# Patient Record
Sex: FEMALE | Race: WHITE | URBAN - METROPOLITAN AREA
[De-identification: names, ages, dates, MRNs, and addresses within clinical notes are randomized per-mention and may not be internally consistent; named-entity substitution may affect disease eponyms.]

---

## 2020-01-02 SDOH — HEALTH STABILITY: MENTAL HEALTH: HOW OFTEN DO YOU HAVE A DRINK CONTAINING ALCOHOL?: NEVER

## 2020-01-07 ASSESSMENT — MIFFLIN-ST. JEOR: SCORE: 1500.4

## 2020-01-09 ENCOUNTER — ANESTHESIA (OUTPATIENT)
Dept: SURGERY | Facility: AMBULATORY SURGERY CENTER | Age: 50
End: 2020-01-09

## 2020-01-09 ENCOUNTER — ANESTHESIA EVENT (OUTPATIENT)
Dept: SURGERY | Facility: AMBULATORY SURGERY CENTER | Age: 50
End: 2020-01-09

## 2020-01-09 ENCOUNTER — HOSPITAL ENCOUNTER (OUTPATIENT)
Facility: AMBULATORY SURGERY CENTER | Age: 50
Discharge: HOME | End: 2020-01-09
Attending: PLASTIC SURGERY | Admitting: PLASTIC SURGERY

## 2020-01-09 VITALS
OXYGEN SATURATION: 95 % | WEIGHT: 202 LBS | HEART RATE: 114 BPM | TEMPERATURE: 98.4 F | HEIGHT: 63 IN | DIASTOLIC BLOOD PRESSURE: 60 MMHG | SYSTOLIC BLOOD PRESSURE: 105 MMHG | RESPIRATION RATE: 16 BRPM | BODY MASS INDEX: 35.79 KG/M2

## 2020-01-09 DIAGNOSIS — R11.0 NAUSEA AFTER ANESTHESIA, INITIAL ENCOUNTER: ICD-10-CM

## 2020-01-09 DIAGNOSIS — T88.59XA NAUSEA AFTER ANESTHESIA, INITIAL ENCOUNTER: ICD-10-CM

## 2020-01-09 DIAGNOSIS — B99.9 INFECTION: ICD-10-CM

## 2020-01-09 DIAGNOSIS — R52 PAIN: Primary | ICD-10-CM

## 2020-01-09 LAB — HCG UR QL: NEGATIVE

## 2020-01-09 PROCEDURE — 36000148

## 2020-01-09 PROCEDURE — G8916 PT W IV AB GIVEN ON TIME: HCPCS

## 2020-01-09 PROCEDURE — 81025 URINE PREGNANCY TEST: CPT | Performed by: ANESTHESIOLOGY

## 2020-01-09 PROCEDURE — G8907 PT DOC NO EVENTS ON DISCHARG: HCPCS

## 2020-01-09 RX ORDER — ONDANSETRON 2 MG/ML
INJECTION INTRAMUSCULAR; INTRAVENOUS PRN
Status: DISCONTINUED | OUTPATIENT
Start: 2020-01-09 | End: 2020-01-09

## 2020-01-09 RX ORDER — LIDOCAINE 40 MG/G
CREAM TOPICAL
Status: DISCONTINUED | OUTPATIENT
Start: 2020-01-09 | End: 2020-01-10 | Stop reason: HOSPADM

## 2020-01-09 RX ORDER — LIDOCAINE HYDROCHLORIDE 20 MG/ML
INJECTION, SOLUTION INFILTRATION; PERINEURAL PRN
Status: DISCONTINUED | OUTPATIENT
Start: 2020-01-09 | End: 2020-01-09

## 2020-01-09 RX ORDER — PROPOFOL 10 MG/ML
INJECTION, EMULSION INTRAVENOUS CONTINUOUS PRN
Status: DISCONTINUED | OUTPATIENT
Start: 2020-01-09 | End: 2020-01-09

## 2020-01-09 RX ORDER — GINSENG 100 MG
CAPSULE ORAL PRN
Status: DISCONTINUED | OUTPATIENT
Start: 2020-01-09 | End: 2020-01-09 | Stop reason: HOSPADM

## 2020-01-09 RX ORDER — CEPHALEXIN 500 MG/1
500 CAPSULE ORAL 2 TIMES DAILY
Qty: 28 CAPSULE | Refills: 0
Start: 2020-01-09 | End: 2020-01-16

## 2020-01-09 RX ORDER — BUPIVACAINE HYDROCHLORIDE AND EPINEPHRINE 2.5; 5 MG/ML; UG/ML
INJECTION, SOLUTION INFILTRATION; PERINEURAL PRN
Status: DISCONTINUED | OUTPATIENT
Start: 2020-01-09 | End: 2020-01-09 | Stop reason: HOSPADM

## 2020-01-09 RX ORDER — DIAZEPAM 10 MG
10 TABLET ORAL EVERY 12 HOURS PRN
Status: DISCONTINUED
Start: 2020-01-09 | End: 2020-01-10 | Stop reason: HOSPADM

## 2020-01-09 RX ORDER — ONDANSETRON 4 MG/1
8 TABLET, ORALLY DISINTEGRATING ORAL EVERY 8 HOURS PRN
Qty: 4 TABLET | Refills: 0
Start: 2020-01-09

## 2020-01-09 RX ORDER — CEFAZOLIN SODIUM 2 G/100ML
2 INJECTION, SOLUTION INTRAVENOUS
Status: COMPLETED | OUTPATIENT
Start: 2020-01-09 | End: 2020-01-09

## 2020-01-09 RX ORDER — HYDROXYZINE PAMOATE 25 MG/1
25 CAPSULE ORAL EVERY 6 HOURS PRN
Qty: 30 CAPSULE | Refills: 0
Start: 2020-01-09

## 2020-01-09 RX ORDER — ONDANSETRON 4 MG/1
4 TABLET, ORALLY DISINTEGRATING ORAL
Status: DISCONTINUED | OUTPATIENT
Start: 2020-01-09 | End: 2020-01-10 | Stop reason: HOSPADM

## 2020-01-09 RX ORDER — EPHEDRINE SULFATE 50 MG/ML
INJECTION, SOLUTION INTRAMUSCULAR; INTRAVENOUS; SUBCUTANEOUS PRN
Status: DISCONTINUED | OUTPATIENT
Start: 2020-01-09 | End: 2020-01-09

## 2020-01-09 RX ORDER — FENTANYL CITRATE 50 UG/ML
INJECTION, SOLUTION INTRAMUSCULAR; INTRAVENOUS PRN
Status: DISCONTINUED | OUTPATIENT
Start: 2020-01-09 | End: 2020-01-09

## 2020-01-09 RX ORDER — PROPOFOL 10 MG/ML
INJECTION, EMULSION INTRAVENOUS PRN
Status: DISCONTINUED | OUTPATIENT
Start: 2020-01-09 | End: 2020-01-09

## 2020-01-09 RX ORDER — GABAPENTIN 300 MG/1
300 CAPSULE ORAL 3 TIMES DAILY
Status: COMPLETED | OUTPATIENT
Start: 2020-01-09 | End: 2020-01-09

## 2020-01-09 RX ORDER — OXYCODONE AND ACETAMINOPHEN 5; 325 MG/1; MG/1
1-2 TABLET ORAL EVERY 4 HOURS PRN
Qty: 20 TABLET | Refills: 0
Start: 2020-01-09

## 2020-01-09 RX ORDER — SODIUM CHLORIDE, SODIUM LACTATE, POTASSIUM CHLORIDE, CALCIUM CHLORIDE 600; 310; 30; 20 MG/100ML; MG/100ML; MG/100ML; MG/100ML
INJECTION, SOLUTION INTRAVENOUS CONTINUOUS
Status: DISCONTINUED | OUTPATIENT
Start: 2020-01-09 | End: 2020-01-10 | Stop reason: HOSPADM

## 2020-01-09 RX ORDER — ACETAMINOPHEN 325 MG/1
975 TABLET ORAL EVERY 4 HOURS PRN
Status: COMPLETED | OUTPATIENT
Start: 2020-01-09 | End: 2020-01-09

## 2020-01-09 RX ORDER — OXYCODONE AND ACETAMINOPHEN 5; 325 MG/1; MG/1
2 TABLET ORAL
Status: COMPLETED | OUTPATIENT
Start: 2020-01-09 | End: 2020-01-09

## 2020-01-09 RX ORDER — HYDROXYZINE HYDROCHLORIDE 25 MG/1
25 TABLET, FILM COATED ORAL
Status: DISCONTINUED | OUTPATIENT
Start: 2020-01-09 | End: 2020-01-10 | Stop reason: HOSPADM

## 2020-01-09 RX ORDER — DEXAMETHASONE SODIUM PHOSPHATE 4 MG/ML
10 INJECTION, SOLUTION INTRA-ARTICULAR; INTRALESIONAL; INTRAMUSCULAR; INTRAVENOUS; SOFT TISSUE
Status: DISCONTINUED | OUTPATIENT
Start: 2020-01-09 | End: 2020-01-10 | Stop reason: HOSPADM

## 2020-01-09 RX ORDER — CEFAZOLIN SODIUM 1 G/3ML
1 INJECTION, POWDER, FOR SOLUTION INTRAMUSCULAR; INTRAVENOUS SEE ADMIN INSTRUCTIONS
Status: DISCONTINUED | OUTPATIENT
Start: 2020-01-09 | End: 2020-01-10 | Stop reason: HOSPADM

## 2020-01-09 RX ORDER — GLYCOPYRROLATE 0.2 MG/ML
INJECTION, SOLUTION INTRAMUSCULAR; INTRAVENOUS PRN
Status: DISCONTINUED | OUTPATIENT
Start: 2020-01-09 | End: 2020-01-09

## 2020-01-09 RX ORDER — NEOSTIGMINE METHYLSULFATE 1 MG/ML
VIAL (ML) INJECTION PRN
Status: DISCONTINUED | OUTPATIENT
Start: 2020-01-09 | End: 2020-01-09

## 2020-01-09 RX ADMIN — Medication 100 MCG: at 10:25

## 2020-01-09 RX ADMIN — GLYCOPYRROLATE 0.2 MG: 0.2 INJECTION, SOLUTION INTRAMUSCULAR; INTRAVENOUS at 12:33

## 2020-01-09 RX ADMIN — Medication 100 MCG: at 09:53

## 2020-01-09 RX ADMIN — Medication 100 MCG: at 12:08

## 2020-01-09 RX ADMIN — Medication 10 MG: at 08:03

## 2020-01-09 RX ADMIN — LIDOCAINE HYDROCHLORIDE 100 MG: 20 INJECTION, SOLUTION INFILTRATION; PERINEURAL at 07:25

## 2020-01-09 RX ADMIN — Medication 100 MCG: at 11:47

## 2020-01-09 RX ADMIN — CEFAZOLIN SODIUM 1 G: 1 INJECTION, POWDER, FOR SOLUTION INTRAMUSCULAR; INTRAVENOUS at 09:28

## 2020-01-09 RX ADMIN — ACETAMINOPHEN 975 MG: 325 TABLET ORAL at 06:56

## 2020-01-09 RX ADMIN — Medication 100 MCG: at 09:24

## 2020-01-09 RX ADMIN — Medication 100 MCG: at 10:03

## 2020-01-09 RX ADMIN — Medication 100 MCG: at 11:15

## 2020-01-09 RX ADMIN — Medication 100 MCG: at 12:28

## 2020-01-09 RX ADMIN — Medication 100 MCG: at 09:48

## 2020-01-09 RX ADMIN — Medication 100 MCG: at 10:50

## 2020-01-09 RX ADMIN — EPHEDRINE SULFATE 5 MG: 50 INJECTION, SOLUTION INTRAMUSCULAR; INTRAVENOUS; SUBCUTANEOUS at 12:01

## 2020-01-09 RX ADMIN — OXYCODONE AND ACETAMINOPHEN 1 TABLET: 5; 325 TABLET ORAL at 13:38

## 2020-01-09 RX ADMIN — SODIUM CHLORIDE, SODIUM LACTATE, POTASSIUM CHLORIDE, CALCIUM CHLORIDE: 600; 310; 30; 20 INJECTION, SOLUTION INTRAVENOUS at 12:19

## 2020-01-09 RX ADMIN — Medication 100 MCG: at 10:56

## 2020-01-09 RX ADMIN — Medication 10 MG: at 10:15

## 2020-01-09 RX ADMIN — Medication 100 MCG: at 09:56

## 2020-01-09 RX ADMIN — FENTANYL CITRATE 50 MCG: 50 INJECTION, SOLUTION INTRAMUSCULAR; INTRAVENOUS at 08:02

## 2020-01-09 RX ADMIN — Medication 100 MCG: at 11:30

## 2020-01-09 RX ADMIN — Medication 100 MCG: at 09:40

## 2020-01-09 RX ADMIN — Medication 2 MG: at 12:33

## 2020-01-09 RX ADMIN — SODIUM CHLORIDE, SODIUM LACTATE, POTASSIUM CHLORIDE, CALCIUM CHLORIDE: 600; 310; 30; 20 INJECTION, SOLUTION INTRAVENOUS at 08:43

## 2020-01-09 RX ADMIN — Medication 100 MCG: at 10:39

## 2020-01-09 RX ADMIN — EPHEDRINE SULFATE 5 MG: 50 INJECTION, SOLUTION INTRAMUSCULAR; INTRAVENOUS; SUBCUTANEOUS at 12:38

## 2020-01-09 RX ADMIN — PROPOFOL 50 MG: 10 INJECTION, EMULSION INTRAVENOUS at 07:27

## 2020-01-09 RX ADMIN — CEFAZOLIN SODIUM 2 G: 2 INJECTION, SOLUTION INTRAVENOUS at 07:28

## 2020-01-09 RX ADMIN — Medication 100 MCG: at 09:32

## 2020-01-09 RX ADMIN — Medication 1 MG: at 10:42

## 2020-01-09 RX ADMIN — GABAPENTIN 300 MG: 300 CAPSULE ORAL at 06:56

## 2020-01-09 RX ADMIN — Medication 100 MCG: at 11:59

## 2020-01-09 RX ADMIN — Medication 100 MCG: at 11:51

## 2020-01-09 RX ADMIN — Medication 40 MG: at 07:25

## 2020-01-09 RX ADMIN — EPHEDRINE SULFATE 5 MG: 50 INJECTION, SOLUTION INTRAMUSCULAR; INTRAVENOUS; SUBCUTANEOUS at 11:50

## 2020-01-09 RX ADMIN — CEFAZOLIN SODIUM 1 G: 1 INJECTION, POWDER, FOR SOLUTION INTRAMUSCULAR; INTRAVENOUS at 11:29

## 2020-01-09 RX ADMIN — Medication 100 MCG: at 10:53

## 2020-01-09 RX ADMIN — Medication 1 MG: at 07:48

## 2020-01-09 RX ADMIN — Medication 100 MCG: at 12:13

## 2020-01-09 RX ADMIN — SODIUM CHLORIDE, SODIUM LACTATE, POTASSIUM CHLORIDE, CALCIUM CHLORIDE: 600; 310; 30; 20 INJECTION, SOLUTION INTRAVENOUS at 11:16

## 2020-01-09 RX ADMIN — Medication 150 MCG: at 10:34

## 2020-01-09 RX ADMIN — Medication 100 MCG: at 11:28

## 2020-01-09 RX ADMIN — Medication 100 MCG: at 10:42

## 2020-01-09 RX ADMIN — Medication 100 MCG: at 09:59

## 2020-01-09 RX ADMIN — Medication 100 MCG: at 10:58

## 2020-01-09 RX ADMIN — Medication 100 MCG: at 11:35

## 2020-01-09 RX ADMIN — Medication 100 MCG: at 10:13

## 2020-01-09 RX ADMIN — Medication 100 MCG: at 12:25

## 2020-01-09 RX ADMIN — Medication 150 MCG: at 10:30

## 2020-01-09 RX ADMIN — Medication 50 MCG: at 11:09

## 2020-01-09 RX ADMIN — PROPOFOL 250 MCG/KG/MIN: 10 INJECTION, EMULSION INTRAVENOUS at 07:25

## 2020-01-09 RX ADMIN — ONDANSETRON 4 MG: 2 INJECTION INTRAMUSCULAR; INTRAVENOUS at 11:56

## 2020-01-09 RX ADMIN — PROPOFOL 200 MG: 10 INJECTION, EMULSION INTRAVENOUS at 07:25

## 2020-01-09 RX ADMIN — Medication 50 MCG: at 11:07

## 2020-01-09 RX ADMIN — Medication 100 MCG: at 11:55

## 2020-01-09 RX ADMIN — Medication 100 MCG: at 11:39

## 2020-01-09 RX ADMIN — SODIUM CHLORIDE, SODIUM LACTATE, POTASSIUM CHLORIDE, CALCIUM CHLORIDE: 600; 310; 30; 20 INJECTION, SOLUTION INTRAVENOUS at 06:45

## 2020-01-09 RX ADMIN — Medication 100 MCG: at 09:28

## 2020-01-09 RX ADMIN — GLYCOPYRROLATE 0.2 MG: 0.2 INJECTION, SOLUTION INTRAMUSCULAR; INTRAVENOUS at 12:45

## 2020-01-09 RX ADMIN — Medication 100 MCG: at 12:38

## 2020-01-09 RX ADMIN — EPHEDRINE SULFATE 5 MG: 50 INJECTION, SOLUTION INTRAMUSCULAR; INTRAVENOUS; SUBCUTANEOUS at 11:45

## 2020-01-09 RX ADMIN — Medication 100 MCG: at 11:19

## 2020-01-09 RX ADMIN — EPHEDRINE SULFATE 5 MG: 50 INJECTION, SOLUTION INTRAMUSCULAR; INTRAVENOUS; SUBCUTANEOUS at 12:30

## 2020-01-09 RX ADMIN — Medication 100 MCG: at 09:50

## 2020-01-09 RX ADMIN — FENTANYL CITRATE 50 MCG: 50 INJECTION, SOLUTION INTRAMUSCULAR; INTRAVENOUS at 07:22

## 2020-01-09 RX ADMIN — Medication 100 MCG: at 11:13

## 2020-01-09 RX ADMIN — Medication 100 MCG: at 10:45

## 2020-01-09 NOTE — BRIEF OP NOTE
[facility] Brief Operative Note    Pre-operative diagnosis: Skin laxity of lower abdomen, ;localized fat accumulation of upper abdomen, inner and anterior knees and thighs, buttocks   Post-operative diagnosis same   Procedure: Procedure(s):  Mini tummy tuck  LIPOSUCTION, upper abdomen, inner and anterior knees, and thighs   Surgeon(s): Surgeon(s) and Role:     * Blaine Love MD - Primary   Estimated blood loss: 125ml    Specimens: none   Findings:  Assist: andrew Reis  Complications: none  Condition: extubated and stable to PAR    Blaine Love MD None

## 2020-01-09 NOTE — ANESTHESIA POSTPROCEDURE EVALUATION
Anesthesia POST Procedure Evaluation    Patient: Mukesh Bryant   MRN:     [id number] Gender:   female   Age:    49 year old :      1970        Preoperative Diagnosis: * No pre-op diagnosis entered *   Procedure(s):  Mini tummy tuck  LIPOSUCTION, upper abdomen, inner and anterior knees, and thighs   Postop Comments: No value filed.       Anesthesia Type:  No value filed.  No value filed.    Reportable Event: NO     PAIN: Uncomplicated   Sign Out status: Comfortable, Well controlled pain     PONV: No PONV   Sign Out status:  No Nausea or Vomiting     Neuro/Psych: Uneventful perioperative course   Sign Out Status: Preoperative baseline; Age appropriate mentation     Airway/Resp.: Uneventful perioperative course   Sign Out Status: Non labored breathing, age appropriate RR; Resp. Status within EXPECTED Parameters     CV: Uneventful perioperative course   Sign Out status: Appropriate BP and perfusion indices; Appropriate HR/Rhythm     Disposition:   Sign Out in:  PACU  Disposition:  Phase II; Home  Recovery Course: Uneventful  Follow-Up: Not required           Last Anesthesia Record Vitals:  CRNA VITALS  2020 1237 - 2020 1337      2020             Pulse:  118    SpO2:  94 %          Last PACU Vitals:  Vitals Value Taken Time   /77 2020  2:10 PM   Temp 36.9  C (98.4  F) 2020  1:30 PM   Pulse 114 2020  2:10 PM   Resp 15 2020  2:12 PM   SpO2 95 % 2020  2:14 PM   Temp src     NIBP     Pulse 118 2020  1:08 PM   SpO2 94 % 2020  1:08 PM   Resp     Temp     Ht Rate     Temp 2     Vitals shown include unvalidated device data.      Electronically Signed By: Tanner Ivy MD, 2020, 3:36 PM

## 2020-01-09 NOTE — ANESTHESIA PREPROCEDURE EVALUATION
Anesthesia Pre-Procedure Evaluation    Patient: Mukesh Bryant   MRN:     [id number] Gender:   female   Age:    49 year old :      1970        Preoperative Diagnosis: * No pre-op diagnosis entered *   Procedure(s):  Mini tummy tuck  LIPOSUCTION, upper abdomen, inner and anterior knees, and thighs     Past Medical History:   Diagnosis Date     PONV (postoperative nausea and vomiting)      Thyroid disease       Past Surgical History:   Procedure Laterality Date     GYN SURGERY       THYROIDECTOMY             Anesthesia Evaluation     . Pt has had prior anesthetic.     History of anesthetic complications   - PONV        ROS/MED HX    ENT/Pulmonary:  - neg pulmonary ROS     Neurologic:  - neg neurologic ROS     Cardiovascular:  - neg cardiovascular ROS       METS/Exercise Tolerance:     Hematologic:  - neg hematologic  ROS       Musculoskeletal:  - neg musculoskeletal ROS       GI/Hepatic:  - neg GI/hepatic ROS       Renal/Genitourinary:  - ROS Renal section negative       Endo:  - neg endo ROS   (+) thyroid problem hypothyroidism, .      Psychiatric:  - neg psychiatric ROS       Infectious Disease:  - neg infectious disease ROS       Malignancy:      - no malignancy   Other:    - neg other ROS                     PHYSICAL EXAM:   Mental Status/Neuro: A/A/O   Airway: Facies: Feasible  Mallampati: I  Mouth/Opening: Full  TM distance: > 6 cm  Neck ROM: Full   Respiratory: Auscultation: CTAB     Resp. Rate: Normal     Resp. Effort: Normal      CV: Rhythm: Regular  Rate: Age appropriate  Heart: Normal Sounds  Edema: None   Comments:      Dental: Normal Dentition                LABS:  CBC: No results found for: WBC, HGB, HCT, PLT  BMP: No results found for: NA, POTASSIUM, CHLORIDE, CO2, BUN, CR, GLC  COAGS: No results found for: PTT, INR, FIBR  POC:   Lab Results   Component Value Date    HCG Negative 2020     OTHER: No results found for: PH, LACT, A1C, JEANNIE, PHOS, MAG, ALBUMIN, PROTTOTAL, ALT, AST, GGT,  "ALKPHOS, BILITOTAL, BILIDIRECT, LIPASE, AMYLASE, KRISTINE, TSH, T4, T3, CRP, SED     Preop Vitals    BP Readings from Last 3 Encounters:   01/09/20 105/60    Pulse Readings from Last 3 Encounters:   01/09/20 114      Resp Readings from Last 3 Encounters:   01/09/20 16    SpO2 Readings from Last 3 Encounters:   01/09/20 95%      Temp Readings from Last 1 Encounters:   01/09/20 36.9  C (98.4  F)    Ht Readings from Last 1 Encounters:   01/07/20 1.6 m (5' 3\")      Wt Readings from Last 1 Encounters:   01/07/20 91.6 kg (202 lb)    Estimated body mass index is 35.78 kg/m  as calculated from the following:    Height as of this encounter: 1.6 m (5' 3\").    Weight as of this encounter: 91.6 kg (202 lb).     LDA:  Closed/Suction Drain Left Abdomen Bulb 15 Hebrew (Active)   Site Description UTV 1/9/2020  2:14 PM   Output (ml) 0 ml 1/9/2020  2:14 PM   Number of days: 0       Closed/Suction Drain Right Abdomen Bulb 15 Hebrew (Active)   Site Description UTV 1/9/2020  2:14 PM   Drainage Appearance Bloody/Bright Red 1/9/2020  2:14 PM   Output (ml) 15 ml 1/9/2020  1:08 PM   Number of days: 0        Assessment:   ASA SCORE: 1    H&P: History and physical reviewed and following examination; no interval change.   Smoking Status:  Non-Smoker/Unknown   NPO Status: NPO Appropriate     Plan:   Anes. Type:  General   Pre-Medication: None   Induction:  IV (Standard)   Airway: ETT; Oral   Access/Monitoring: PIV   Maintenance: TIVA     Postop Plan:   Postop Pain: Opioids  Postop Sedation/Airway: Not planned  Disposition: Outpatient     PONV Management:   Adult Risk Factors: Female, H/o PONV or Motion Sickness, Non-Smoker, Postop Opioids   Prevention: Ondansetron, Dexamethasone, Scopolamine, Propofol, No Volatiles     CONSENT: Direct conversation   Plan and risks discussed with: Patient   Blood Products: Consent Deferred (Minimal Blood Loss)                   Tanner Ivy MD  "

## 2020-01-09 NOTE — ANESTHESIA CARE TRANSFER NOTE
Patient: Mukesh Bryant    Procedure(s):  Mini tummy tuck  LIPOSUCTION, upper abdomen, inner and anterior knees, and thighs    Diagnosis: * No pre-op diagnosis entered *  Diagnosis Additional Information: No value filed.    Anesthesia Type:   No value filed.     Note:  Airway :Nasal Cannula  Patient transferred to:PACU        Vitals: (Last set prior to Anesthesia Care Transfer)    CRNA VITALS  1/9/2020 1237 - 1/9/2020 1312      1/9/2020             Pulse:  118    SpO2:  94 %                Electronically Signed By: MORGAN Franco CRNA  January 9, 2020  1:12 PM

## 2020-01-09 NOTE — DISCHARGE INSTRUCTIONS
[facility]   Adult Discharge Orders & Instructions     For 24 hours after surgery    1. Get plenty of rest.  A responsible adult must stay with you for at least 24 hours after you leave the hospital.   2. Do not drive or use heavy equipment.  If you have weakness or tingling, don't drive or use heavy equipment until this feeling goes away.  3. Do not drink alcohol.  4. Avoid strenuous or risky activities.  Ask for help when climbing stairs.   5. You may feel lightheaded.  IF so, sit for a few minutes before standing.  Have someone help you get up.   6. If you have nausea (feel sick to your stomach): Drink only clear liquids such as apple juice, ginger ale, broth or 7-Up.  Rest may also help.  Be sure to drink enough fluids.  Move to a regular diet as you feel able.  7. You may have a slight fever. Call the doctor if your fever is over 100 F (37.7 C) (taken under the tongue) or lasts longer than 24 hours.  8. You may have a dry mouth, a sore throat, muscle aches or trouble sleeping.  These should go away after 24 hours.  9. Do not make important or legal decisions.   Call your doctor for any of the followin.  Signs of infection (fever, growing tenderness at the surgery site, a large amount of drainage or bleeding, severe pain, foul-smelling drainage, redness, swelling).    2. It has been over 8 to 10 hours since surgery and you are still not able to urinate (pass water).    3.  Headache for over 24 hours.    To contact Dr Courtney call:    [phone] - Day  [phone] - After hours pager

## 2020-01-20 NOTE — OP NOTE
Procedure Date: 01/09/2020      SURGEON:  Blaine Love M.D.      PREOPERATIVE DIAGNOSES:   1. Localized fat accumulation upper abdomen, inner and anterior lower aspect of her upper leg as well as her inner knee, inner thigh and inner curvature of the buttock.   2. Excess lower abdominal skin secondary to a twin pregnancy.      POSTOPERATIVE DIAGNOSES:   1. Localized fat accumulation upper abdomen, inner and anterior lower aspect of her upper leg as well as her inner knee, inner thigh and inner curvature of the buttock.   2. Excess lower abdominal skin secondary to a twin pregnancy.      ANESTHESIA:  General endotracheal.       OPERATION:   1. Suction-assisted lipectomy ofupper abdomen, inner and anterior lower aspect of her upper leg as well as her inner knee, inner thigh and inner curvature of the buttock.    Total suction-assisted lipectomy volume 3150 mL .Wetting solution utilized 4100 mL of Tumescent/wetting solution mixed as follows:  In each liter of lactated Ringer's was placed 1.5 mL of 1:100,000 epinephrine and 10 mL of 1% plain Xylocaine.   2. Mini abdominoplasty, excess removed 742 Grams     INDICATIONS:  The patient is a 49-year-old female and mother of twin boys who presents to clinic for suction-assisted lipectomy and mini abdominoplasty.  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  The patient does understand that I will only be able to plicate the rectus abdominis in the infraumbilical abdomen.  This may result in fullness of her supraumbilical abdomen where I am unable to tighten her rectus abdominis musculature through a mini abdominoplasty approach.  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  The patient understands that the limiting factor in terms of how much liposuction I can perform in her thighs is her skin elasticity.  A 49 years of age she has certainly lost some skin elasticity and will have to be quite mindful performing her surgery.  I will concentrate on performing liposuction utilizing curved Mladick cannulas are designed to follow the natural curvature of her thighs and abdomen.  The level of liposuction will be performed over her musculature  to debulk the thighs and gain maximal skin retraction.      The patient understands liposuction is a means of removing localized fat accumulation.  She understands the key to maintaining the result we obtain through the operation is a combination of diet and exercise.  The patient has been to my clinic on several occasions to discuss what she can realistically expect from the operation.      The patient realizes that it is essential she wear a compression garment for at least 6 weeks following the operation to aid in skin retraction.  The patient was shown the location of the incisions to include those in the groin crease, gluteal fold, mid medial thigh, inner knee, distal extremity proximal to the patellar tendon and laterally at the junction of her quadriceps and tensor fascia landen.  She understands these scars are 1/4 inch in length and will take 1 year to soften, mature and fade to white.  She understands the importance of wearing 3M Tegaderm dressings over the incisions to gain optimal healing.  She understands in the areas where liposuction is performed, there will be diminished sensation, particularly to light touch, which will return to full sensation in 2-3 months' time.  She understands that she is 49 years of age and I cannot predict how her skin will retract.  She understands liposuction is not a treatment for cellulitis or stretch marks.  She was told there is a risk of bleeding, infection, hematoma, seroma,  hypertrophic scarring, and contour irregularities.  The patient was told there is a remote chance of pulmonary edema, fat emboli and deep vein thrombosis and pulmonary emboli.  Maneuvers utilized to measure chances of DVT and PE include the use of SPEEDY hose, sequential compression stockings and early and frequent ambulation.  With regard to abdominoplasty, the patient was shown locations incision will run from the crest of her hip following the groin crease across the upper mons and up the opposite groin crease to the opposite crest of hip.  The patient understands there will be 2 closed suction drains placed beneath the abdominoplasty flap and will exit the lateral aspects of the incision so as to leave no additional visible scars.  The drains are placed to decrease chance of seroma formation.  The patient understands I will be using a technique known as SAFE liposuction as first described by Lalo Covarrubias MD from [place].  The technique uses a Hartmann basket cannula on non-suction mode to loosen the fat from the interstitial connective tissue.  The aspiration phase of SAFE liposuction is performed using curved Mladick cannulas to follow the natural curvature of a woman's body.  The patient understands the risks of the abdominoplasty including chance of bleeding, infection, hematoma, seroma, hypertrophic scarring, wound healing problems, and a remote possibility of avascular necrosis of the abdominoplasty flap.  Deep vein thrombosis, pulmonary emboli and fat emboli are also remote possibilities.  We will use sequential compression stockings prior to induction through the entire operation until she is discharged from the postanesthesia care unit.  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  I have told her to dorsiflex her calf muscles and perform calf muscle pumps in her chair postoperatively as a  means of diminishing the chance of DVT and PE.  In the preoperative holding area, the areas of liposuction were marked as was her bikini line.      DESCRIPTION OF THE PROCEDURE AND FINDINGS:  The patient was taken to the operating room, placed in supine position on operating table.  Successful general endotracheal anesthetic was then induced.  The patient received 2 grams of Ancef intravenously at the beginning of the case and 10 mg Decadron intravenously.  The Ancef was repeated every 2 hours throughout the case.  Sequential compression stockings were placed on her lower extremities prior to induction of the anesthetic.  A fluid warmer was used by Anesthesia to maintain a warm body temperature.  An over the table Jennyfer Hugger was also utilized to keep her warm.  At this point, the patient was prepped and draped from her breasts to her knees and circumferentially.  The sequential compression stockings were prepped and draped out of the field using stockinette and sterile Coban.  A timeout was called at 7:45 a.m.  Incisions were then made under the dutta of the umbilicus, within her pubic area and the areas to be excised with the abdominoplasty, in her groin crease, gluteal fold, mid medial thigh and incisions for the quadriceps tendon proximal to the patellar tendon and the inner knee and at the junction of the quadriceps with the tensor fascia landen.  Wetting solution was then infiltrated into all of the areas including her upper abdomen, inner thighs, and lower extremity, entire upper leg from her bathing suit line to the knees and around the knees.  Using the SAFE liposuction technique, I used a Hartmann basket cannula on non-suction mode.  A 5 mm Hartmann basket cannula was used to perform the separation phase of SAFE liposuction of the upper abdomen and of the abdominoplasty flap.  I then performed the SAFE liposuction technique over her entire lower extremities on the anterior inner and outer surfaces, avoiding the  zone of adhesion of the tensor fascia landen.  I then performed the aspiration phase of SAFE liposuction using a combination of 4 and 3 mm curved Mladick cannulas.  A very even suction lipectomy was performed of her upper abdomen as well as the abdominoplasty flap.  This was tapered into her flanks on both the right and left sides.  Next, I addressed her inner thighs.  A Techni-Care soaked sponge was placed in her groin.  Then, using a combination of different 3 mm curved Mladick cannulas, specifically developed for liposuction of the inner thigh, I liposuctioned of her inner thighs.  A very even suction lipectomy was performed of her inner thigh areas.  I then performed liposuction of over her entire upper leg with the plane of liposuction medially over the musculature to avoid contour irregularities associated with superficial liposuction.  All liposuction access incisions were then closed using 5-0 Monocryl sutures in buried interrupted intercuticular fashion and 5-0 Prolene sutures in simple interrupted fashion.  Liposuction access incisions were then dressed using bacitracin, Mastisol and 3M Tegaderm dressings.      At this point, a Mejia catheter was placed under sterile conditions.  I changed my gloves.  Her abdomen was reprepped and draped in routine sterile fashion.  Incision was then made, which ran from the crest of her hip following the groin crease across the top of the mons approximately 3 cm above the vulvar commissure.  The incision then followed the opposite groin crease to the left crest of hip.  The abdominal skin excess was lifted in a layer over the rectus abdominis fascia, taking care to leave a cobblestone layer of fat over the rectus abdominus fascia to preserve lymphatics and diminish the chances of seroma formation.  All perforating vessels were grasped using an insulated DeBakey monopolar forceps and cauterized.  I dissected to the level of the umbilicus then dissected superior to the  umbilicus on both its right and left sides.      Rectus abdominis plicating sutures were then placed using 0 PDS suture starting below the umbilicus.  This was tied in buried interrupted fashion.  I then ran the suture in buried and running fashion from the umbilicus to the symphysis pubis and arcuate line.  I then used 2-0 PDS sutures to bury my 0 PDS suture.      Next, the Sonja's fascia in the location of the mons pubis was sutured to the fascia of the abdominal wall and rectus abdominis to avoid having the mons hike in a superior direction.  The lateral aspects of the incision were sutured cut to the abdominal fascia as well.      The entire abdominoplasty flap was irrigated with solution consisting of 1 liter of saline and 2 grams of Ancef.  Technicare was also added to this solution.  Copious irrigation was performed.      The patient was then placed in a flexed position on the operating room table to correspond with a beach chair or semi-Walton position.  In this position, the abdominal skin excess was directed downward by means of Kocher clamps.  A horizontal incision was made to correspond with the incision over the mons pubis.  Lateral incisions were then incised as well.  Inset abdominoplasty flap was performed using 3-0 PDS sutures in the Sonja's fascia layer.  A high lateral tension abdominoplasty closure was performed so that most tension was borne on the lateral aspects of the flap rather than the center of the flap.  At this point, two #15 round fluted Bharathi drains were placed beneath the abdominoplasty flap by means of a curved 4 mm Mladick cannula.  I slipped the cannula through the lateral aspect of the incision and brought it under the suture line,  then inserted the drain over the cannula and withdrew it through the lateral aspect of the incision.  The drains were tied in place at the lateral aspects of the incision using 2-0 silk suture in half-hitch fashion.  This was done on both the right  and left sides of the incision.  At this point, the midline abdominoplasty flap was approximated using a perforating towel clip in temporary fashion.  Closure of the center portion of abdominoplasty flap was accomplished using 3-0 PDS sutures in the Sonja's fascia layer.  At this point,  Deep dermal sutures were placed using 3-0 PDS alternating with 4-0 PDS sutures in buried interrupted deep dermal fashion.  The final skin closure was performed using 4-0 Monocryl suture in buried and running continuous intracuticular fashion.  Then 5-0 Prolene sutures were placed to bury Monocryl sutures and obtain exact coaptation of the skin.      The patient was then placed in her compression garment on the operating room table.  Additional compression was performed using Epifoam over the abdominoplasty flap.  Nitro-Bid paste was placed over the mini abdominoplasty for perfusion covered with sheets of Adaptic.  The patient's hospital bed was brought into the operating room, placed in a beach chair position to correspond with the flexed position on the operating room table.  The patient was transferred to her hospital bed in this position.      ESTIMATED BLOOD LOSS:  Less than 100 mL.      COMPLICATIONS:  None.      CONDITION:  Extubated and stable to postanesthesia recovery.      OPERATIVE TIMES:  Scheduled total operating room time 6 hours and 30 minutes equals 6 hours of paid surgical operative time plus 30 minutes of anesthesia/operating room time.  Actual total operating room time 5 hours and 50 minutes equals  actual surgical operative time 5 hours and 9 minutes plus 41 minutes of anesthesia/room time.  It should be noted that paid surgical operative time is 51 minutes ahead of what the patient actually paid.  Anesthesia operating room time was 40 minutes behind scheduled time.  Despite this, the actual total operating room time of 5 hours, 50 minutes is 40 minutes ahead of scheduled operating room time.      ROOM IN TIME:   Starting 7:17 a.m. OR timeout:  7:45 a.m.  Time out:  7:47 a.m.  Surgery end time:  12:54 p.m.  Room out time:  1:07 p.m.            TERRANCE YAO MD             D: 2020   T: 2020   MT: TODD      Name:     REI WALL   MRN:      [id number]        Account:        [id number]   :      1970           Procedure Date: 2020      Document: [id number]

## (undated) DEVICE — PREP CHLORAPREP 26ML TINTED ORANGE  260815

## (undated) DEVICE — SU MONOCRYL 5-0 P-3 18" UND Y493G

## (undated) DEVICE — DRAPE SHEET HALF 40X60" 9358

## (undated) DEVICE — ESU NDL COLORADO MICRO 3CM STR N103A

## (undated) DEVICE — STPL SKIN 35W 054887

## (undated) DEVICE — DRAPE SPLIT EENT 76X124" 3X28" 9447

## (undated) DEVICE — DRAPE STOCKINETTE IMPERVIOUS 12" 1587

## (undated) DEVICE — SPONGE LAP 18X18" 1515

## (undated) DEVICE — SU MONOCRYL 4-0 P-3 18" UND Y494G

## (undated) DEVICE — CATH TRAY FOLEY 16FR W/URINE METER FSD MCRB STATLOCK 303416A

## (undated) DEVICE — ESU ELEC BLADE 2.75" COATED/INSULATED E1455

## (undated) DEVICE — DRSG ADAPTIC 3X8" 6113

## (undated) DEVICE — DRSG TEGADERM 2 3/8X2 3/4" 1624W

## (undated) DEVICE — SU PDS II 4-0 P-3 18" Z494G

## (undated) DEVICE — SU PDS II 3-0 PS-2 18" Z497G

## (undated) DEVICE — SOL WATER IRRIG 1000ML BOTTLE 07139-09

## (undated) DEVICE — SYR BULB IRRIG DOVER 60 ML LATEX FREE 67000

## (undated) DEVICE — DECANTER TRANSFER DEVICE 2008S

## (undated) DEVICE — SOL NACL 0.9% INJ 1000ML BAG 07983-09

## (undated) DEVICE — DRAIN JACKSON PRATT CHANNEL 15FR ROUND HUBLESS SIL JP-2228

## (undated) DEVICE — SUCTION TIP YANKAUER W/O VENT K86

## (undated) DEVICE — BLADE KNIFE SURG 15 371115

## (undated) DEVICE — SU SILK 2-0 FSL 18" 677G

## (undated) DEVICE — GLOVE PROTEXIS W/NEU-THERA 7.5  2D73TE75

## (undated) DEVICE — DRAIN JACKSON PRATT RESERVOIR 100ML SU130-1305

## (undated) DEVICE — SU PDS II 2-0 SH 27" Z317H

## (undated) DEVICE — BNDG COBAN 6"X5YDS STERILE

## (undated) DEVICE — SU PROLENE 5-0 P-3 18" 8698G

## (undated) DEVICE — DRSG KERLIX 4 1/2"X4YDS ROLL 6715

## (undated) DEVICE — SU MONOCRYL 4-0 PS-2 18" UND Y496G

## (undated) DEVICE — TUBING INFUSION INFILTRATION LIPOSUCTION 156" 24-6008

## (undated) DEVICE — PIN SAFTY INF 1.5" STERILE SS C18700-020

## (undated) DEVICE — PACK SET-UP STD 9102

## (undated) DEVICE — SUCTION TUBING 10' N1010

## (undated) DEVICE — ESU GROUND PAD ADULT W/CORD E7507

## (undated) DEVICE — ADH LIQUID MASTISOL TOPICAL VIAL 2-3ML 0523-48

## (undated) DEVICE — Device

## (undated) DEVICE — SU PDS II 0 CT-1 27" Z340H

## (undated) DEVICE — GLOVE PROTEXIS W/NEU-THERA 6.5  2D73TE65

## (undated) DEVICE — GLOVE PROTEXIS BLUE W/NEU-THERA 7.0  2D73EB70

## (undated) DEVICE — GOWN XLG DISP 9545

## (undated) DEVICE — DRAPE SHEET 3/4 78X60"

## (undated) DEVICE — CATH INTERMITTENT CLEAN-CATH 18FR 16" VINYL LF 421718

## (undated) DEVICE — BLADE KNIFE SURG 10 371110

## (undated) DEVICE — PACK MINOR SBA15MIFSE

## (undated) RX ORDER — HYDROMORPHONE HYDROCHLORIDE 2 MG/ML
INJECTION, SOLUTION INTRAMUSCULAR; INTRAVENOUS; SUBCUTANEOUS
Status: DISPENSED
Start: 2020-01-09

## (undated) RX ORDER — GLYCOPYRROLATE 0.2 MG/ML
INJECTION INTRAMUSCULAR; INTRAVENOUS
Status: DISPENSED
Start: 2020-01-09

## (undated) RX ORDER — FENTANYL CITRATE 50 UG/ML
INJECTION, SOLUTION INTRAMUSCULAR; INTRAVENOUS
Status: DISPENSED
Start: 2020-01-09

## (undated) RX ORDER — BUPIVACAINE HYDROCHLORIDE 2.5 MG/ML
INJECTION, SOLUTION INFILTRATION; PERINEURAL
Status: DISPENSED
Start: 2020-01-09

## (undated) RX ORDER — DEXAMETHASONE SODIUM PHOSPHATE 4 MG/ML
INJECTION, SOLUTION INTRA-ARTICULAR; INTRALESIONAL; INTRAMUSCULAR; INTRAVENOUS; SOFT TISSUE
Status: DISPENSED
Start: 2020-01-09

## (undated) RX ORDER — PHENYLEPHRINE HCL IN 0.9% NACL 1 MG/10 ML
SYRINGE (ML) INTRAVENOUS
Status: DISPENSED
Start: 2020-01-09

## (undated) RX ORDER — GINSENG 100 MG
CAPSULE ORAL
Status: DISPENSED
Start: 2020-01-09

## (undated) RX ORDER — SCOLOPAMINE TRANSDERMAL SYSTEM 1 MG/1
PATCH, EXTENDED RELEASE TRANSDERMAL
Status: DISPENSED
Start: 2020-01-09

## (undated) RX ORDER — EPHEDRINE SULFATE 50 MG/ML
INJECTION, SOLUTION INTRAMUSCULAR; INTRAVENOUS; SUBCUTANEOUS
Status: DISPENSED
Start: 2020-01-09

## (undated) RX ORDER — EPINEPHRINE 1 MG/ML
INJECTION, SOLUTION INTRAMUSCULAR; SUBCUTANEOUS
Status: DISPENSED
Start: 2020-01-09

## (undated) RX ORDER — GABAPENTIN 300 MG/1
CAPSULE ORAL
Status: DISPENSED
Start: 2020-01-09

## (undated) RX ORDER — ONDANSETRON 2 MG/ML
INJECTION INTRAMUSCULAR; INTRAVENOUS
Status: DISPENSED
Start: 2020-01-09

## (undated) RX ORDER — PROPOFOL 10 MG/ML
INJECTION, EMULSION INTRAVENOUS
Status: DISPENSED
Start: 2020-01-09

## (undated) RX ORDER — CEFAZOLIN SODIUM 1 G/3ML
INJECTION, POWDER, FOR SOLUTION INTRAMUSCULAR; INTRAVENOUS
Status: DISPENSED
Start: 2020-01-09

## (undated) RX ORDER — ACETAMINOPHEN 325 MG/1
TABLET ORAL
Status: DISPENSED
Start: 2020-01-09

## (undated) RX ORDER — LIDOCAINE HYDROCHLORIDE 10 MG/ML
INJECTION, SOLUTION EPIDURAL; INFILTRATION; INTRACAUDAL; PERINEURAL
Status: DISPENSED
Start: 2020-01-09

## (undated) RX ORDER — CEFAZOLIN SODIUM 2 G/100ML
INJECTION, SOLUTION INTRAVENOUS
Status: DISPENSED
Start: 2020-01-09

## (undated) RX ORDER — LIDOCAINE HYDROCHLORIDE 20 MG/ML
INJECTION, SOLUTION INFILTRATION; PERINEURAL
Status: DISPENSED
Start: 2020-01-09

## (undated) RX ORDER — PHENYLEPHRINE HYDROCHLORIDE 10 MG/ML
INJECTION INTRAVENOUS
Status: DISPENSED
Start: 2020-01-09

## (undated) RX ORDER — OXYCODONE AND ACETAMINOPHEN 5; 325 MG/1; MG/1
TABLET ORAL
Status: DISPENSED
Start: 2020-01-09

## (undated) RX ORDER — NEOSTIGMINE METHYLSULFATE 1 MG/ML
VIAL (ML) INJECTION
Status: DISPENSED
Start: 2020-01-09